# Patient Record
Sex: MALE | Race: WHITE | NOT HISPANIC OR LATINO | URBAN - METROPOLITAN AREA
[De-identification: names, ages, dates, MRNs, and addresses within clinical notes are randomized per-mention and may not be internally consistent; named-entity substitution may affect disease eponyms.]

---

## 2021-01-25 ENCOUNTER — OFFICE VISIT - HEALTHEAST (OUTPATIENT)
Dept: FAMILY MEDICINE | Facility: CLINIC | Age: 36
End: 2021-01-25

## 2021-01-25 DIAGNOSIS — R20.2 NUMBNESS AND TINGLING IN BOTH HANDS: ICD-10-CM

## 2021-01-25 DIAGNOSIS — R20.0 NUMBNESS AND TINGLING IN BOTH HANDS: ICD-10-CM

## 2021-01-25 DIAGNOSIS — R20.2 NUMBNESS AND TINGLING OF BOTH LEGS BELOW KNEES: ICD-10-CM

## 2021-01-25 DIAGNOSIS — R20.0 NUMBNESS AND TINGLING OF BOTH LEGS BELOW KNEES: ICD-10-CM

## 2021-01-25 DIAGNOSIS — F41.0 PANIC ATTACK: ICD-10-CM

## 2021-01-25 DIAGNOSIS — F41.9 ANXIETY: ICD-10-CM

## 2021-01-25 LAB
ALBUMIN SERPL-MCNC: 4.1 G/DL (ref 3.5–5)
ALP SERPL-CCNC: 88 U/L (ref 45–120)
ALT SERPL W P-5'-P-CCNC: 17 U/L (ref 0–45)
ANION GAP SERPL CALCULATED.3IONS-SCNC: 8 MMOL/L (ref 5–18)
AST SERPL W P-5'-P-CCNC: 18 U/L (ref 0–40)
BILIRUB SERPL-MCNC: 0.8 MG/DL (ref 0–1)
BUN SERPL-MCNC: 8 MG/DL (ref 8–22)
CALCIUM SERPL-MCNC: 9.1 MG/DL (ref 8.5–10.5)
CHLORIDE BLD-SCNC: 107 MMOL/L (ref 98–107)
CO2 SERPL-SCNC: 26 MMOL/L (ref 22–31)
CREAT SERPL-MCNC: 0.86 MG/DL (ref 0.7–1.3)
ERYTHROCYTE [DISTWIDTH] IN BLOOD BY AUTOMATED COUNT: 12.4 % (ref 11–14.5)
ERYTHROCYTE [SEDIMENTATION RATE] IN BLOOD BY WESTERGREN METHOD: 2 MM/HR (ref 0–15)
GFR SERPL CREATININE-BSD FRML MDRD: >60 ML/MIN/1.73M2
GLUCOSE BLD-MCNC: 89 MG/DL (ref 70–125)
HCT VFR BLD AUTO: 43.3 % (ref 40–54)
HGB BLD-MCNC: 14.4 G/DL (ref 14–18)
MCH RBC QN AUTO: 28.3 PG (ref 27–34)
MCHC RBC AUTO-ENTMCNC: 33.2 G/DL (ref 32–36)
MCV RBC AUTO: 85 FL (ref 80–100)
PLATELET # BLD AUTO: 224 THOU/UL (ref 140–440)
PMV BLD AUTO: 8.7 FL (ref 7–10)
POTASSIUM BLD-SCNC: 4.1 MMOL/L (ref 3.5–5)
PROT SERPL-MCNC: 7.1 G/DL (ref 6–8)
RBC # BLD AUTO: 5.08 MILL/UL (ref 4.4–6.2)
SODIUM SERPL-SCNC: 141 MMOL/L (ref 136–145)
TSH SERPL DL<=0.005 MIU/L-ACNC: 2.49 UIU/ML (ref 0.3–5)
VIT B12 SERPL-MCNC: 374 PG/ML (ref 213–816)
WBC: 6.7 THOU/UL (ref 4–11)

## 2021-02-15 ENCOUNTER — COMMUNICATION - HEALTHEAST (OUTPATIENT)
Dept: PHYSICAL MEDICINE AND REHAB | Facility: CLINIC | Age: 36
End: 2021-02-15

## 2021-05-28 ENCOUNTER — RECORDS - HEALTHEAST (OUTPATIENT)
Dept: ADMINISTRATIVE | Facility: CLINIC | Age: 36
End: 2021-05-28

## 2021-05-30 ENCOUNTER — RECORDS - HEALTHEAST (OUTPATIENT)
Dept: ADMINISTRATIVE | Facility: CLINIC | Age: 36
End: 2021-05-30

## 2021-05-31 ENCOUNTER — RECORDS - HEALTHEAST (OUTPATIENT)
Dept: ADMINISTRATIVE | Facility: CLINIC | Age: 36
End: 2021-05-31

## 2021-06-05 VITALS
SYSTOLIC BLOOD PRESSURE: 130 MMHG | HEART RATE: 72 BPM | WEIGHT: 179.25 LBS | BODY MASS INDEX: 21.39 KG/M2 | DIASTOLIC BLOOD PRESSURE: 80 MMHG

## 2021-06-14 NOTE — PROGRESS NOTES
Please call the patient and give them my message below,    John  Your results from your recent clinic visit show:  Your thyroid (TSH) test was normal  Your CMP was normal with normal electrolytes, kidney function, and liver function  Your B12 was normal  Your Sedimentation rate was normal  Your CBC is normal with no anemia or signs of infection seen    If you have more questions please send me a MyChart message saying you saw me or call the clinic    Dr. Sudheer Mari

## 2021-06-14 NOTE — PROGRESS NOTES
HPI:   Igor Choudhary is a 35 y.o.  male who presents for:    Chief Complaint   Patient presents with     discolored legs and pain       Igor presents with the concern of discolored protruding veins in his legs as well as recent numbness and tingling in his legs and hands.    Veins  Patient states he has had raised discolored veins from his left lower leg protruding for many months.  Not associated with pain now.  Did have an episode of acute swelling and pain a couple of weeks ago only over the area of varicose veins and not circumferential around his calf.  This self resolved with time.    Numbness tingling in feet and hands  Patient states recently he started developing some numbness and tingling in his hands and feet.  It does not occur all the time but seems to flare occasionally especially when he is in bed.  He does have a  brother who  from various complications one of them being type 1 diabetes.  He denies excessive thirst or urination lately.  He does have a family history of thyroid dysfunction though he denies cold intolerance, weight changes, appetite changes.  He notes he does not eat well chronically and his weight is low due to this.  Asking about panic attack symptoms he states he did feel he was breathing quite fast during it and felt a sense of doom with it.  He notes that he has been anxious lately as there have been some recent life stressors with his girlfriend leaving him and taking his child soon.         PMHX:     Patient Active Problem List   Diagnosis     Nicotine Dependence     Nephrolithiasis     Erectile dysfunction     Vitamin D deficiency     Non-recurrent unilateral inguinal hernia without obstruction or gangrene     Disturbance in sleep behavior       Current Outpatient Medications   Medication Sig Dispense Refill     ibuprofen (ADVIL,MOTRIN) 800 MG tablet Take 1 tablet (800 mg total) by mouth 3 (three) times a day. 21 tablet 0     No current  facility-administered medications for this visit.        Social History     Tobacco Use     Smoking status: Current Every Day Smoker     Types: Cigarettes   Substance Use Topics     Alcohol use: Yes     Comment: occasional     Drug use: No       Social History     Social History Narrative     Not on file       Allergies   Allergen Reactions     Bupropion Hcl      Varenicline               Review of Systems:    Complete ROS is negative except as noted in the HPI         Physical Exam:   /80   Pulse 72   Wt 179 lb 4 oz (81.3 kg)   BMI 21.39 kg/m      General appearance: Alert, cooperative, no distress, appears stated age, anxious mood  Head: Normocephalic, atraumatic, without obvious abnormality  Eyes: Pupils equal round, reactive.  Conjunctiva clear.  Nose: Nares normal, no drainage.  Throat: Lips, mucosa, tongue normal mucosa pink and moist  Neck: Supple, symmetric, trachea midline, no adenopathy   Lungs: Clear to auscultation bilaterally, no wheezing or crackles present.  Respirations unlabored  Heart: Regular rate and rhythm, normal S1 and S2, no murmur, rub or gallop.  Extremities: Extremities atraumatic.  No cyanosis or edema.   Skin: Skin color, texture, turgor normal no rashes or lesions on limited skin exam.  Varicose veins over left lower extremity medial aspect, nontender to palpation and no rash overlying seen  Neurologic: CN II through XII intact, normal strength.  Sensation to pinprick is normal in hands nd feet bilaterally      Assessment and Plan   1. Numbness and tingling of both legs below knees  2. Numbness and tingling in both hands  3. Panic attack  4. Anxiety  Believe either somatic symptoms related to panic attack.  Exacerbated by patient's recent life stressors as above.  Will work-up for medical etiology though with labs below.  Would not do more than these if these come back normal for now.  Would advise if persistent treating for generalized anxiety disorder with antidepressants and  counseling and seeing if symptoms improve with that.  - Comprehensive Metabolic Panel  - Thyroid Stimulating Hormone (TSH)  - Vitamin B12  - HM2(CBC w/o Differential)  - Sedimentation Rate      Follow up: Advised doing physical in 1-2 months  Options for treatment and follow-up care were reviewed with the patient and/or guardian. Igor Choudhary and/or guardian engaged in the decision making process and verbalized understanding of the options discussed and agreed with the final plan.    Dr. Sudheer Mari MD

## 2021-06-16 PROBLEM — K40.90 NON-RECURRENT UNILATERAL INGUINAL HERNIA WITHOUT OBSTRUCTION OR GANGRENE: Status: ACTIVE | Noted: 2018-03-27

## 2021-06-21 NOTE — LETTER
Letter by Pearl Holman at      Author: Pearl Holman Service: -- Author Type: --    Filed:  Encounter Date: 2/15/2021 Status: (Other)         Igor Choudhary  [address] [facility] [id number]      February 15, 2021      Dear Mr. Choudhary,    At [facility], we care about your health and well-being. Recently, we received a  referral to get you scheduled at the [facility]. We have attempted to  assist you in scheduling this appointment and have been unsuccessful in reaching you.    Please call our Intake line at your earliest convenience for assistance in scheduling an  appointment. Thank you for choosing [facility].      Sincerely,        [facility] Intake team  [phone]